# Patient Record
Sex: MALE | URBAN - NONMETROPOLITAN AREA
[De-identification: names, ages, dates, MRNs, and addresses within clinical notes are randomized per-mention and may not be internally consistent; named-entity substitution may affect disease eponyms.]

---

## 2019-05-01 ENCOUNTER — APPOINTMENT (RX ONLY)
Dept: URBAN - NONMETROPOLITAN AREA CLINIC 17 | Facility: CLINIC | Age: 59
Setting detail: DERMATOLOGY
End: 2019-05-01

## 2019-05-01 DIAGNOSIS — L71.8 OTHER ROSACEA: ICD-10-CM

## 2019-05-01 PROCEDURE — 99202 OFFICE O/P NEW SF 15 MIN: CPT

## 2019-05-01 PROCEDURE — ? PRESCRIPTION

## 2019-05-01 PROCEDURE — ? COUNSELING

## 2019-05-01 PROCEDURE — ? TREATMENT REGIMEN

## 2019-05-01 RX ORDER — CLINDAMYCIN PHOS/BENZOYL PEROX 1 %-5 %
GEL (GRAM) TOPICAL
Qty: 1 | Refills: 0 | Status: ERX | COMMUNITY
Start: 2019-05-01

## 2019-05-01 RX ORDER — MINOCYCLINE HYDROCHLORIDE 100 MG/1
CAPSULE ORAL
Qty: 60 | Refills: 0 | Status: ERX | COMMUNITY
Start: 2019-05-01

## 2019-05-01 RX ADMIN — MINOCYCLINE HYDROCHLORIDE: 100 CAPSULE ORAL at 00:00

## 2019-05-01 RX ADMIN — Medication: at 00:00

## 2019-05-01 ASSESSMENT — LOCATION SIMPLE DESCRIPTION DERM: LOCATION SIMPLE: NOSE

## 2019-05-01 ASSESSMENT — LOCATION ZONE DERM: LOCATION ZONE: NOSE

## 2019-05-01 ASSESSMENT — LOCATION DETAILED DESCRIPTION DERM: LOCATION DETAILED: NASAL SUPRATIP

## 2019-05-01 NOTE — PROCEDURE: TREATMENT REGIMEN
Detail Level: Zone
Continue Regimen: Minocycline 100mg twice daily, Hydroxyzine 2 nightly (Both prescribed by outside provider)
Initiate Treatment: Benzaclin to nose once in the morning and entire face nightly.

## 2019-05-30 ENCOUNTER — RX ONLY (OUTPATIENT)
Age: 59
Setting detail: RX ONLY
End: 2019-05-30

## 2019-05-30 ENCOUNTER — APPOINTMENT (RX ONLY)
Dept: URBAN - NONMETROPOLITAN AREA CLINIC 17 | Facility: CLINIC | Age: 59
Setting detail: DERMATOLOGY
End: 2019-05-30

## 2019-05-30 DIAGNOSIS — L71.1 RHINOPHYMA: ICD-10-CM

## 2019-05-30 DIAGNOSIS — L71.8 OTHER ROSACEA: ICD-10-CM

## 2019-05-30 PROCEDURE — 99213 OFFICE O/P EST LOW 20 MIN: CPT

## 2019-05-30 PROCEDURE — ? TREATMENT REGIMEN

## 2019-05-30 PROCEDURE — ? COUNSELING

## 2019-05-30 RX ORDER — MINOCYCLINE HYDROCHLORIDE 100 MG/1
CAPSULE ORAL
Qty: 60 | Refills: 1 | Status: ERX

## 2019-05-30 ASSESSMENT — LOCATION SIMPLE DESCRIPTION DERM
LOCATION SIMPLE: NOSE
LOCATION SIMPLE: LEFT CHEEK

## 2019-05-30 ASSESSMENT — LOCATION DETAILED DESCRIPTION DERM
LOCATION DETAILED: LEFT CENTRAL MALAR CHEEK
LOCATION DETAILED: NASAL SUPRATIP

## 2019-05-30 ASSESSMENT — LOCATION ZONE DERM
LOCATION ZONE: FACE
LOCATION ZONE: NOSE

## 2019-05-30 NOTE — PROCEDURE: TREATMENT REGIMEN
Detail Level: Zone
Initiate Treatment: Differin gradually every other night
Continue Regimen: Minocycline 100mg twice daily, Benzaclin in morning

## 2019-06-27 ENCOUNTER — APPOINTMENT (RX ONLY)
Dept: URBAN - NONMETROPOLITAN AREA CLINIC 17 | Facility: CLINIC | Age: 59
Setting detail: DERMATOLOGY
End: 2019-06-27

## 2019-06-27 DIAGNOSIS — L71.1 RHINOPHYMA: ICD-10-CM

## 2019-06-27 DIAGNOSIS — L71.8 OTHER ROSACEA: ICD-10-CM | Status: WELL CONTROLLED

## 2019-06-27 PROCEDURE — ? DEFER

## 2019-06-27 PROCEDURE — 99213 OFFICE O/P EST LOW 20 MIN: CPT

## 2019-06-27 PROCEDURE — ? ADDITIONAL NOTES

## 2019-06-27 PROCEDURE — ? TREATMENT REGIMEN

## 2019-06-27 PROCEDURE — ? PRESCRIPTION

## 2019-06-27 RX ORDER — MUPIROCIN 20 MG/G
OINTMENT TOPICAL
Qty: 1 | Refills: 3 | Status: ERX | COMMUNITY
Start: 2019-06-27

## 2019-06-27 RX ADMIN — MUPIROCIN: 20 OINTMENT TOPICAL at 00:00

## 2019-06-27 ASSESSMENT — LOCATION SIMPLE DESCRIPTION DERM
LOCATION SIMPLE: NOSE
LOCATION SIMPLE: LEFT CHEEK
LOCATION SIMPLE: INFERIOR FOREHEAD

## 2019-06-27 ASSESSMENT — LOCATION DETAILED DESCRIPTION DERM
LOCATION DETAILED: NASAL INFRATIP
LOCATION DETAILED: LEFT CENTRAL MALAR CHEEK
LOCATION DETAILED: NASAL TIP
LOCATION DETAILED: INFERIOR MID FOREHEAD

## 2019-06-27 ASSESSMENT — LOCATION ZONE DERM
LOCATION ZONE: NOSE
LOCATION ZONE: FACE

## 2019-06-27 NOTE — PROCEDURE: TREATMENT REGIMEN
Continue Regimen: Benzaclin in morning, and differin gradually every other night increasing to nightly.
Detail Level: Zone
Modify Regimen: Minocycline 100mg once daily.

## 2019-06-27 NOTE — PROCEDURE: DEFER
Detail Level: Simple
Anesthesia Type: 1% lidocaine with epinephrine
Additional Anesthesia Volume In Cc: 6
Post-Care Instructions: I reviewed with the patient in detail post-care instructions. Patient is not to engage in any heavy lifting, exercise, or swimming for the next 14 days. Should the patient develop any fevers, chills, bleeding, severe pain patient will contact the office immediately.
Consent was obtained from the patient. The risks and benefits to therapy were discussed in detail. Specifically, the risks of infection, scarring, bleeding, prolonged wound healing, incomplete removal, inability to restore the nose to the exact predisease shape, allergy to anesthesia, nerve injury and recurrence were addressed. Prior to the procedure, the treatment site was clearly identified and confirmed by the patient. All components of Universal Protocol/PAUSE Rule completed. Photographs of the patient prior to the onset of the disease were reviewed.
Bill For Surgical Tray: no
Defect Size In Cm (Optional): 0
Exicision Text: Excision - Debulking, Sculpting, Contouring
Estimated Blood Loss (Cc): minimal
Wound Care: Polysporin ointment
Procedure Text: A 15 blade was used to tangentially debulk excessive rhinophymatous nasal tissue, sparing as many follicular islets as possible. An electrosurgical cautery unit with a wire loop was used to sculpt and reshape the nose. A dermabrader with a cone shaped fraise was then used to contour the nose.
Anesthesia Volume In Cc: 8
Detail Level: Detailed
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
Introduction Text (Please End With A Colon): The following procedure was deferred:
Dressing: bandage
Other Procedure: Rhinohyma Excision

## 2019-08-02 ENCOUNTER — APPOINTMENT (RX ONLY)
Dept: URBAN - NONMETROPOLITAN AREA CLINIC 17 | Facility: CLINIC | Age: 59
Setting detail: DERMATOLOGY
End: 2019-08-02

## 2019-08-02 DIAGNOSIS — L71.1 RHINOPHYMA: ICD-10-CM

## 2019-08-02 DIAGNOSIS — L71.8 OTHER ROSACEA: ICD-10-CM

## 2019-08-02 PROCEDURE — ? ADDITIONAL PATHOLOGY SPECIMEN

## 2019-08-02 PROCEDURE — ? PRESCRIPTION

## 2019-08-02 PROCEDURE — ? TREATMENT REGIMEN

## 2019-08-02 PROCEDURE — 99213 OFFICE O/P EST LOW 20 MIN: CPT | Mod: 25

## 2019-08-02 PROCEDURE — 30120 REVISION OF NOSE: CPT

## 2019-08-02 PROCEDURE — ? ADDITIONAL NOTES

## 2019-08-02 PROCEDURE — ? RHINOPHYMA EXCISION

## 2019-08-02 RX ORDER — HYDROCODONE BITARTRATE AND ACETAMINOPHEN 10; 325 MG/1; MG/1
TABLET ORAL
Qty: 30 | Refills: 0 | COMMUNITY
Start: 2019-08-02

## 2019-08-02 RX ADMIN — HYDROCODONE BITARTRATE AND ACETAMINOPHEN: 10; 325 TABLET ORAL at 14:47

## 2019-08-02 ASSESSMENT — LOCATION DETAILED DESCRIPTION DERM
LOCATION DETAILED: NASAL SUPRATIP
LOCATION DETAILED: LEFT CENTRAL MALAR CHEEK
LOCATION DETAILED: NASAL TIP
LOCATION DETAILED: INFERIOR MID FOREHEAD

## 2019-08-02 ASSESSMENT — LOCATION SIMPLE DESCRIPTION DERM
LOCATION SIMPLE: NOSE
LOCATION SIMPLE: INFERIOR FOREHEAD
LOCATION SIMPLE: LEFT CHEEK

## 2019-08-02 ASSESSMENT — LOCATION ZONE DERM
LOCATION ZONE: NOSE
LOCATION ZONE: FACE

## 2019-08-02 NOTE — PROCEDURE: TREATMENT REGIMEN
Detail Level: Zone
Modify Regimen: Minocycline 100mg once daily.
Continue Regimen: Benzaclin in morning, and differin gradually every other night increasing to nightly.

## 2019-08-02 NOTE — PROCEDURE: ADDITIONAL PATHOLOGY SPECIMEN
Render Path Notes In Note?: No
X Size Of Lesion In Cm (Optional): 0
Billing Type: Third-Party Bill
Biopsy Type: H and E (Permanent Final Margin)
Detail Level: Detailed
Notification Instructions: Patient will be notified of biopsy results. However, patient instructed to call the office if not contacted within 2 weeks.

## 2019-08-02 NOTE — PROCEDURE: RHINOPHYMA EXCISION
Defect Size In Cm (Optional): 4.5
Wound Care: Mupirocin
Anesthesia Type: 1% lidocaine with epinephrine
Procedure Text: A [employer] radio frequency device was used to tangentially debulk excessive rhinophymatous nasal tissue including the bulbous distal nose as well as the nasal alae sparing as many follicular islets as possible. An electrosurgical cautery unit with a hockey stick was used to sculpt and reshape the nose. An electrosurgical cautery was then used to finely contour the nose and control minimal bleeding
Additional Anesthesia Volume In Cc: 6
Post-Care Instructions: I reviewed with the patient in detail post-care instructions. Patient is not to engage in any heavy lifting, exercise, or swimming for the next 14 days. Should the patient develop any fevers, chills, bleeding, severe pain patient will contact the office immediately.
Preop Width In Cm (Optional): 0
Consent was obtained from the patient. The risks and benefits to therapy were discussed in detail. Specifically, the risks of infection, scarring, bleeding, prolonged wound healing, incomplete removal, inability to restore the nose to the exact predisease shape, allergy to anesthesia, nerve injury and recurrence were addressed. Prior to the procedure, the treatment site was clearly identified and confirmed by the patient. All components of Universal Protocol/PAUSE Rule completed. Photographs of the patient prior to the onset of the disease were reviewed.
Detail Level: Simple
Exicision Text: Excision - Debulking, Sculpting, Contouring
Anesthesia Volume In Cc: 12
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
Dressing: bandage
Bill For Surgical Tray: no
Preop Length In Cm (Optional): 4.2
Estimated Blood Loss (Cc): minimal

## 2019-08-02 NOTE — PROCEDURE: ADDITIONAL NOTES
Detail Level: Simple
Additional Notes: Obstructing to patients nasal passage causing difficulty breathing while sleeping and a nasal tone in his voice. Restoration of red, swollen, thickened infiltrative nodules (rhinophyma) is medically necessary due to causing significant disfigurement of the nose.

## 2019-08-05 ENCOUNTER — APPOINTMENT (RX ONLY)
Dept: URBAN - NONMETROPOLITAN AREA CLINIC 17 | Facility: CLINIC | Age: 59
Setting detail: DERMATOLOGY
End: 2019-08-05

## 2019-08-05 DIAGNOSIS — L71.8 OTHER ROSACEA: ICD-10-CM

## 2019-08-05 DIAGNOSIS — Z48.817 ENCOUNTER FOR SURGICAL AFTERCARE FOLLOWING SURGERY ON THE SKIN AND SUBCUTANEOUS TISSUE: ICD-10-CM

## 2019-08-05 PROCEDURE — 97597 DBRDMT OPN WND 1ST 20 CM/<: CPT

## 2019-08-05 PROCEDURE — ? TREATMENT REGIMEN

## 2019-08-05 PROCEDURE — ? COUNSELING

## 2019-08-05 PROCEDURE — ? DEBRIDEMENT OF OPEN WOUND

## 2019-08-05 PROCEDURE — 99213 OFFICE O/P EST LOW 20 MIN: CPT | Mod: 25,24

## 2019-08-05 ASSESSMENT — LOCATION SIMPLE DESCRIPTION DERM
LOCATION SIMPLE: NOSE
LOCATION SIMPLE: LEFT CHEEK
LOCATION SIMPLE: NOSE
LOCATION SIMPLE: INFERIOR FOREHEAD

## 2019-08-05 ASSESSMENT — LOCATION DETAILED DESCRIPTION DERM
LOCATION DETAILED: NASAL DORSUM
LOCATION DETAILED: LEFT CENTRAL MALAR CHEEK
LOCATION DETAILED: NASAL DORSUM
LOCATION DETAILED: INFERIOR MID FOREHEAD

## 2019-08-05 ASSESSMENT — LOCATION ZONE DERM
LOCATION ZONE: NOSE
LOCATION ZONE: NOSE
LOCATION ZONE: FACE

## 2019-08-05 NOTE — PROCEDURE: TREATMENT REGIMEN
Modify Regimen: Minocycline 100mg once daily.
Continue Regimen: Benzaclin in morning, and differin gradually every other night increasing to nightly.
Detail Level: Zone

## 2019-08-05 NOTE — PROCEDURE: DEBRIDEMENT OF OPEN WOUND
Detail Level: Detailed
Consent was obtained from the patient. The risks, benefits and alternatives to therapy were discussed in detail. Specifically, the risks of infection, scarring, bleeding, prolonged wound healing, nerve injury, and allergy to anesthesia were addressed. Alternatives to debridement, such as aggressive wound care, were also discussed.  Prior to the procedure, the treatment site was clearly identified and confirmed by the patient. All components of Universal Protocol/PAUSE Rule completed.
Procedure: Debridement of wound tissue less than 20sq cm
Post-Care Instructions: I reviewed with the patient in detail post-care instructions. Patient is to keep the area dry for 48 hours, and not to engage in any swimming until the area is healed. Should the patient develop any fevers, chills, bleeding, severe pain patient will contact the office immediately.
Size Of Wound In Cm2 (Optional): 0
Render Post-Care Instructions In Note?: no
Anesthesia Type: 1% lidocaine with epinephrine

## 2019-08-08 ENCOUNTER — APPOINTMENT (RX ONLY)
Dept: URBAN - NONMETROPOLITAN AREA CLINIC 17 | Facility: CLINIC | Age: 59
Setting detail: DERMATOLOGY
End: 2019-08-08

## 2019-08-08 DIAGNOSIS — Z48.817 ENCOUNTER FOR SURGICAL AFTERCARE FOLLOWING SURGERY ON THE SKIN AND SUBCUTANEOUS TISSUE: ICD-10-CM

## 2019-08-08 DIAGNOSIS — L71.8 OTHER ROSACEA: ICD-10-CM

## 2019-08-08 PROCEDURE — ? COUNSELING

## 2019-08-08 PROCEDURE — ? TREATMENT REGIMEN

## 2019-08-08 PROCEDURE — ? DEBRIDEMENT OF OPEN WOUND

## 2019-08-08 PROCEDURE — 97597 DBRDMT OPN WND 1ST 20 CM/<: CPT

## 2019-08-08 PROCEDURE — 99213 OFFICE O/P EST LOW 20 MIN: CPT | Mod: 25,24

## 2019-08-08 ASSESSMENT — LOCATION DETAILED DESCRIPTION DERM
LOCATION DETAILED: INFERIOR MID FOREHEAD
LOCATION DETAILED: NASAL DORSUM
LOCATION DETAILED: NASAL DORSUM
LOCATION DETAILED: LEFT CENTRAL MALAR CHEEK

## 2019-08-08 ASSESSMENT — LOCATION SIMPLE DESCRIPTION DERM
LOCATION SIMPLE: NOSE
LOCATION SIMPLE: LEFT CHEEK
LOCATION SIMPLE: INFERIOR FOREHEAD
LOCATION SIMPLE: NOSE

## 2019-08-08 ASSESSMENT — LOCATION ZONE DERM
LOCATION ZONE: NOSE
LOCATION ZONE: NOSE
LOCATION ZONE: FACE

## 2019-08-08 NOTE — PROCEDURE: TREATMENT REGIMEN
Detail Level: Zone
Discontinue Regimen: Benzaclin in morning, and differin gradually every other night increasing to nightly until patient’s nose is healed well,
Modify Regimen: Minocycline 100mg once daily.

## 2019-08-08 NOTE — PROCEDURE: DEBRIDEMENT OF OPEN WOUND
Detail Level: Detailed
Anesthesia Volume In Cc: 0
Render Post-Care Instructions In Note?: no
Procedure: Debridement of wound tissue less than 20sq cm
Post-Care Instructions: I reviewed with the patient in detail post-care instructions. Patient is to keep the area dry for 48 hours, and not to engage in any swimming until the area is healed. Should the patient develop any fevers, chills, bleeding, severe pain patient will contact the office immediately.
Consent was obtained from the patient. The risks, benefits and alternatives to therapy were discussed in detail. Specifically, the risks of infection, scarring, bleeding, prolonged wound healing, nerve injury, and allergy to anesthesia were addressed. Alternatives to debridement, such as aggressive wound care, were also discussed.  Prior to the procedure, the treatment site was clearly identified and confirmed by the patient. All components of Universal Protocol/PAUSE Rule completed.

## 2019-08-15 ENCOUNTER — APPOINTMENT (RX ONLY)
Dept: URBAN - NONMETROPOLITAN AREA CLINIC 17 | Facility: CLINIC | Age: 59
Setting detail: DERMATOLOGY
End: 2019-08-15

## 2019-08-15 DIAGNOSIS — Z48.817 ENCOUNTER FOR SURGICAL AFTERCARE FOLLOWING SURGERY ON THE SKIN AND SUBCUTANEOUS TISSUE: ICD-10-CM

## 2019-08-15 DIAGNOSIS — L71.8 OTHER ROSACEA: ICD-10-CM

## 2019-08-15 PROCEDURE — 97597 DBRDMT OPN WND 1ST 20 CM/<: CPT

## 2019-08-15 PROCEDURE — ? COUNSELING

## 2019-08-15 PROCEDURE — ? DEBRIDEMENT OF OPEN WOUND

## 2019-08-15 PROCEDURE — 99213 OFFICE O/P EST LOW 20 MIN: CPT | Mod: 25

## 2019-08-15 PROCEDURE — ? TREATMENT REGIMEN

## 2019-08-15 ASSESSMENT — LOCATION SIMPLE DESCRIPTION DERM
LOCATION SIMPLE: NOSE
LOCATION SIMPLE: NOSE
LOCATION SIMPLE: INFERIOR FOREHEAD
LOCATION SIMPLE: LEFT CHEEK

## 2019-08-15 ASSESSMENT — LOCATION DETAILED DESCRIPTION DERM
LOCATION DETAILED: NASAL DORSUM
LOCATION DETAILED: INFERIOR MID FOREHEAD
LOCATION DETAILED: NASAL DORSUM
LOCATION DETAILED: LEFT CENTRAL MALAR CHEEK

## 2019-08-15 ASSESSMENT — LOCATION ZONE DERM
LOCATION ZONE: NOSE
LOCATION ZONE: NOSE
LOCATION ZONE: FACE

## 2019-08-15 NOTE — PROCEDURE: TREATMENT REGIMEN
Detail Level: Zone
Modify Regimen: Minocycline 100mg once daily.
Discontinue Regimen: Benzaclin in morning, and differin gradually every other night increasing to nightly until patient’s nose is healed well,

## 2019-08-15 NOTE — PROCEDURE: DEBRIDEMENT OF OPEN WOUND
Detail Level: Detailed
Additional Anesthesia Volume In Cc (Will Not Render If 0): 0
Consent was obtained from the patient. The risks, benefits and alternatives to therapy were discussed in detail. Specifically, the risks of infection, scarring, bleeding, prolonged wound healing, nerve injury, and allergy to anesthesia were addressed. Alternatives to debridement, such as aggressive wound care, were also discussed.  Prior to the procedure, the treatment site was clearly identified and confirmed by the patient. All components of Universal Protocol/PAUSE Rule completed.
Post-Care Instructions: I reviewed with the patient in detail post-care instructions. Patient is to keep the area dry for 48 hours, and not to engage in any swimming until the area is healed. Should the patient develop any fevers, chills, bleeding, severe pain patient will contact the office immediately.
Render Post-Care Instructions In Note?: no
Procedure: Debridement of wound tissue less than 20sq cm

## 2019-08-20 ENCOUNTER — APPOINTMENT (RX ONLY)
Dept: URBAN - NONMETROPOLITAN AREA CLINIC 17 | Facility: CLINIC | Age: 59
Setting detail: DERMATOLOGY
End: 2019-08-20

## 2019-08-20 DIAGNOSIS — L71.8 OTHER ROSACEA: ICD-10-CM

## 2019-08-20 DIAGNOSIS — Z48.817 ENCOUNTER FOR SURGICAL AFTERCARE FOLLOWING SURGERY ON THE SKIN AND SUBCUTANEOUS TISSUE: ICD-10-CM

## 2019-08-20 PROCEDURE — ? DEBRIDEMENT OF OPEN WOUND

## 2019-08-20 PROCEDURE — 99213 OFFICE O/P EST LOW 20 MIN: CPT | Mod: 25

## 2019-08-20 PROCEDURE — 97597 DBRDMT OPN WND 1ST 20 CM/<: CPT

## 2019-08-20 PROCEDURE — ? TREATMENT REGIMEN

## 2019-08-20 PROCEDURE — ? COUNSELING

## 2019-08-20 ASSESSMENT — LOCATION DETAILED DESCRIPTION DERM
LOCATION DETAILED: NASAL DORSUM
LOCATION DETAILED: INFERIOR MID FOREHEAD
LOCATION DETAILED: LEFT CENTRAL MALAR CHEEK
LOCATION DETAILED: NASAL DORSUM

## 2019-08-20 ASSESSMENT — LOCATION ZONE DERM
LOCATION ZONE: NOSE
LOCATION ZONE: NOSE
LOCATION ZONE: FACE

## 2019-08-20 ASSESSMENT — LOCATION SIMPLE DESCRIPTION DERM
LOCATION SIMPLE: LEFT CHEEK
LOCATION SIMPLE: NOSE
LOCATION SIMPLE: INFERIOR FOREHEAD
LOCATION SIMPLE: NOSE

## 2019-08-20 NOTE — PROCEDURE: DEBRIDEMENT OF OPEN WOUND
Render Post-Care Instructions In Note?: no
Procedure: Debridement of wound tissue less than 20sq cm
Consent was obtained from the patient. The risks, benefits and alternatives to therapy were discussed in detail. Specifically, the risks of infection, scarring, bleeding, prolonged wound healing, nerve injury, and allergy to anesthesia were addressed. Alternatives to debridement, such as aggressive wound care, were also discussed.  Prior to the procedure, the treatment site was clearly identified and confirmed by the patient. All components of Universal Protocol/PAUSE Rule completed.
Size Of Wound In Cm2 (Optional): 0
Post-Care Instructions: I reviewed with the patient in detail post-care instructions. Patient is to keep the area dry for 48 hours, and not to engage in any swimming until the area is healed. Should the patient develop any fevers, chills, bleeding, severe pain patient will contact the office immediately.
Detail Level: Detailed

## 2019-08-20 NOTE — PROCEDURE: TREATMENT REGIMEN
Discontinue Regimen: Benzaclin in morning, and differin gradually every other night increasing to nightly until patient’s nose is healed well,
Detail Level: Zone
Modify Regimen: Minocycline 100mg once daily.

## 2019-09-04 ENCOUNTER — APPOINTMENT (RX ONLY)
Dept: URBAN - NONMETROPOLITAN AREA CLINIC 17 | Facility: CLINIC | Age: 59
Setting detail: DERMATOLOGY
End: 2019-09-04

## 2019-09-04 DIAGNOSIS — Z48.817 ENCOUNTER FOR SURGICAL AFTERCARE FOLLOWING SURGERY ON THE SKIN AND SUBCUTANEOUS TISSUE: ICD-10-CM

## 2019-09-04 DIAGNOSIS — L71.8 OTHER ROSACEA: ICD-10-CM

## 2019-09-04 PROCEDURE — ? ACNE SURGERY

## 2019-09-04 PROCEDURE — ? TREATMENT REGIMEN

## 2019-09-04 PROCEDURE — 10040 EXTRACTION: CPT

## 2019-09-04 PROCEDURE — 99213 OFFICE O/P EST LOW 20 MIN: CPT | Mod: 25

## 2019-09-04 PROCEDURE — ? COUNSELING

## 2019-09-04 ASSESSMENT — LOCATION DETAILED DESCRIPTION DERM
LOCATION DETAILED: LEFT CENTRAL MALAR CHEEK
LOCATION DETAILED: INFERIOR MID FOREHEAD
LOCATION DETAILED: NASAL DORSUM
LOCATION DETAILED: NASAL SUPRATIP
LOCATION DETAILED: NASAL DORSUM

## 2019-09-04 ASSESSMENT — LOCATION ZONE DERM
LOCATION ZONE: NOSE
LOCATION ZONE: NOSE
LOCATION ZONE: FACE

## 2019-09-04 ASSESSMENT — LOCATION SIMPLE DESCRIPTION DERM
LOCATION SIMPLE: NOSE
LOCATION SIMPLE: INFERIOR FOREHEAD
LOCATION SIMPLE: LEFT CHEEK
LOCATION SIMPLE: NOSE

## 2019-09-04 NOTE — PROCEDURE: ACNE SURGERY
Render Post-Care Instructions In Note?: no
Detail Level: Detailed
Consent was obtained and risks were reviewed including but not limited to scarring, infection, bleeding, scabbing, incomplete removal, and allergy to anesthesia.
Extraction Method: 22 gauge needle
Acne Type: Pustules
Post-Care Instructions: I reviewed with the patient in detail post-care instructions. Patient is to keep the treatment areas dry overnight, and then apply bacitracin twice daily until healed. Patient may apply hydrogen peroxide soaks to remove any crusting.
Prep Text (Optional): Prior to removal the treatment areas were prepped in the usual fashion.

## 2021-06-18 NOTE — PROCEDURE: ADDITIONAL NOTES
Detail Level: Simple
Additional Notes: Obstructing to patients nasal passage causing difficulty breathing while sleeping and a nasal tone in his voice. Restoration of red, swollen, thickened infiltrative nodules (rhinophyma) is medically necessary due to causing significant disfigurement of the nose.
DISPLAY PLAN FREE TEXT